# Patient Record
Sex: MALE | Race: OTHER | NOT HISPANIC OR LATINO | URBAN - METROPOLITAN AREA
[De-identification: names, ages, dates, MRNs, and addresses within clinical notes are randomized per-mention and may not be internally consistent; named-entity substitution may affect disease eponyms.]

---

## 2019-03-13 ENCOUNTER — INPATIENT (INPATIENT)
Facility: HOSPITAL | Age: 49
LOS: 0 days | Discharge: HOME | DRG: 313 | End: 2019-03-14
Attending: GENERAL ACUTE CARE HOSPITAL | Admitting: HOSPITALIST
Payer: COMMERCIAL

## 2019-03-13 VITALS
SYSTOLIC BLOOD PRESSURE: 132 MMHG | OXYGEN SATURATION: 97 % | WEIGHT: 125 LBS | HEART RATE: 74 BPM | DIASTOLIC BLOOD PRESSURE: 94 MMHG | RESPIRATION RATE: 18 BRPM | HEIGHT: 65 IN | TEMPERATURE: 98 F

## 2019-03-13 DIAGNOSIS — R07.9 CHEST PAIN, UNSPECIFIED: ICD-10-CM

## 2019-03-13 LAB
ALBUMIN SERPL ELPH-MCNC: 4.6 G/DL — SIGNIFICANT CHANGE UP (ref 3.3–5.2)
ALP SERPL-CCNC: 50 U/L — SIGNIFICANT CHANGE UP (ref 40–120)
ALT FLD-CCNC: 46 U/L — HIGH
ANION GAP SERPL CALC-SCNC: 12 MMOL/L — SIGNIFICANT CHANGE UP (ref 5–17)
APTT BLD: 31.4 SEC — SIGNIFICANT CHANGE UP (ref 27.5–36.3)
AST SERPL-CCNC: 29 U/L — SIGNIFICANT CHANGE UP
BASOPHILS # BLD AUTO: 0 K/UL — SIGNIFICANT CHANGE UP (ref 0–0.2)
BASOPHILS NFR BLD AUTO: 0.4 % — SIGNIFICANT CHANGE UP (ref 0–2)
BILIRUB SERPL-MCNC: 0.5 MG/DL — SIGNIFICANT CHANGE UP (ref 0.4–2)
BUN SERPL-MCNC: 11 MG/DL — SIGNIFICANT CHANGE UP (ref 8–20)
CALCIUM SERPL-MCNC: 9 MG/DL — SIGNIFICANT CHANGE UP (ref 8.6–10.2)
CHLORIDE SERPL-SCNC: 106 MMOL/L — SIGNIFICANT CHANGE UP (ref 98–107)
CK MB CFR SERPL CALC: 2.2 NG/ML — SIGNIFICANT CHANGE UP (ref 0–6.7)
CK SERPL-CCNC: 218 U/L — HIGH (ref 30–200)
CO2 SERPL-SCNC: 23 MMOL/L — SIGNIFICANT CHANGE UP (ref 22–29)
CREAT SERPL-MCNC: 0.85 MG/DL — SIGNIFICANT CHANGE UP (ref 0.5–1.3)
EOSINOPHIL # BLD AUTO: 0.1 K/UL — SIGNIFICANT CHANGE UP (ref 0–0.5)
EOSINOPHIL NFR BLD AUTO: 1.2 % — SIGNIFICANT CHANGE UP (ref 0–5)
GLUCOSE SERPL-MCNC: 89 MG/DL — SIGNIFICANT CHANGE UP (ref 70–115)
HCT VFR BLD CALC: 41.7 % — LOW (ref 42–52)
HGB BLD-MCNC: 14.1 G/DL — SIGNIFICANT CHANGE UP (ref 14–18)
INR BLD: 0.99 RATIO — SIGNIFICANT CHANGE UP (ref 0.88–1.16)
LYMPHOCYTES # BLD AUTO: 1.7 K/UL — SIGNIFICANT CHANGE UP (ref 1–4.8)
LYMPHOCYTES # BLD AUTO: 30.5 % — SIGNIFICANT CHANGE UP (ref 20–55)
MCHC RBC-ENTMCNC: 24.8 PG — LOW (ref 27–31)
MCHC RBC-ENTMCNC: 33.8 G/DL — SIGNIFICANT CHANGE UP (ref 32–36)
MCV RBC AUTO: 73.4 FL — LOW (ref 80–94)
MONOCYTES # BLD AUTO: 0.3 K/UL — SIGNIFICANT CHANGE UP (ref 0–0.8)
MONOCYTES NFR BLD AUTO: 5.7 % — SIGNIFICANT CHANGE UP (ref 3–10)
NEUTROPHILS # BLD AUTO: 3.5 K/UL — SIGNIFICANT CHANGE UP (ref 1.8–8)
NEUTROPHILS NFR BLD AUTO: 61.8 % — SIGNIFICANT CHANGE UP (ref 37–73)
PLATELET # BLD AUTO: 220 K/UL — SIGNIFICANT CHANGE UP (ref 150–400)
POTASSIUM SERPL-MCNC: 4.1 MMOL/L — SIGNIFICANT CHANGE UP (ref 3.5–5.3)
POTASSIUM SERPL-SCNC: 4.1 MMOL/L — SIGNIFICANT CHANGE UP (ref 3.5–5.3)
PROT SERPL-MCNC: 7.5 G/DL — SIGNIFICANT CHANGE UP (ref 6.6–8.7)
PROTHROM AB SERPL-ACNC: 11.4 SEC — SIGNIFICANT CHANGE UP (ref 10–12.9)
RBC # BLD: 5.68 M/UL — SIGNIFICANT CHANGE UP (ref 4.6–6.2)
RBC # FLD: 14.3 % — SIGNIFICANT CHANGE UP (ref 11–15.6)
SODIUM SERPL-SCNC: 141 MMOL/L — SIGNIFICANT CHANGE UP (ref 135–145)
TROPONIN T SERPL-MCNC: <0.01 NG/ML — SIGNIFICANT CHANGE UP (ref 0–0.06)
TROPONIN T SERPL-MCNC: <0.01 NG/ML — SIGNIFICANT CHANGE UP (ref 0–0.06)
WBC # BLD: 5.6 K/UL — SIGNIFICANT CHANGE UP (ref 4.8–10.8)
WBC # FLD AUTO: 5.6 K/UL — SIGNIFICANT CHANGE UP (ref 4.8–10.8)

## 2019-03-13 PROCEDURE — 99285 EMERGENCY DEPT VISIT HI MDM: CPT

## 2019-03-13 PROCEDURE — 71046 X-RAY EXAM CHEST 2 VIEWS: CPT | Mod: 26

## 2019-03-13 PROCEDURE — 93010 ELECTROCARDIOGRAM REPORT: CPT

## 2019-03-13 PROCEDURE — 99223 1ST HOSP IP/OBS HIGH 75: CPT

## 2019-03-13 RX ORDER — MORPHINE SULFATE 50 MG/1
1 CAPSULE, EXTENDED RELEASE ORAL EVERY 4 HOURS
Qty: 0 | Refills: 0 | Status: DISCONTINUED | OUTPATIENT
Start: 2019-03-13 | End: 2019-03-14

## 2019-03-13 RX ORDER — ASPIRIN/CALCIUM CARB/MAGNESIUM 324 MG
162 TABLET ORAL DAILY
Qty: 0 | Refills: 0 | Status: DISCONTINUED | OUTPATIENT
Start: 2019-03-13 | End: 2019-03-14

## 2019-03-13 RX ORDER — NITROGLYCERIN 6.5 MG
0.4 CAPSULE, EXTENDED RELEASE ORAL
Qty: 0 | Refills: 0 | Status: DISCONTINUED | OUTPATIENT
Start: 2019-03-13 | End: 2019-03-14

## 2019-03-13 RX ORDER — PANTOPRAZOLE SODIUM 20 MG/1
40 TABLET, DELAYED RELEASE ORAL
Qty: 0 | Refills: 0 | Status: DISCONTINUED | OUTPATIENT
Start: 2019-03-13 | End: 2019-03-14

## 2019-03-13 NOTE — ED ADULT NURSE NOTE - OBJECTIVE STATEMENT
Patient is alert and verbal, report pinching sensation on left chest, weakness onset 1 day ago, went to his PMD and had EKG. Sent to ER for further evaluation. patient denies chest pain at this time. had similar episode 2 years ago.

## 2019-03-13 NOTE — H&P ADULT - HISTORY OF PRESENT ILLNESS
Patient is a 48 year old male with PMH of GERD who presented to the ED with a two day history of left sided chest pain. Patient reports he was in his usual state of health until 2 days ago, when he intermittently developed the chest discomfort, which he describes as a pinching sensation. Denies any radiation or associated symptoms of SOB, nausea, lightheadedness, dizziness, or diaphoresis. Denies any alleviating or aggravating factors. Reports a similar episode 2-3 years ago at [facility], where he had an unrevealing cardiac work up (TTE and stress test). Patient reports he has been under stress recently due to his mother who suffered a CVA. On further evaluation, patient reports pain is intermittent and not particularly related to activity. In the ED, patient had a negative TNI but significant EKG changes. Cardiology was consulted. Patient recently  mg and is scheduled for a TTE and Cardiac CT.

## 2019-03-13 NOTE — ED ADULT NURSE REASSESSMENT NOTE - NS ED NURSE REASSESS COMMENT FT1
Patient received from ED. Patient awake and alert and resting in the bed. no complaints of chest pain or SOB at this time. IV access intact and flushing without difficulty. patient In NSR on the monitor. will continue to monitor for safety.

## 2019-03-13 NOTE — ED ADULT NURSE REASSESSMENT NOTE - NS ED NURSE REASSESS COMMENT FT1
Patient resting in bed, awake, alert and oriented X3, resp even/unlabored, lungs CTAB, denies CP, monitor in progress, awaiting for echo and dispo, will monitor patient.

## 2019-03-13 NOTE — ED PROVIDER NOTE - OBJECTIVE STATEMENT
47yo M no pmhx pw left sided cp. intermittent non raqdiating lasting seconds. last had pain 2 hrs ago. notes mild sob when gets cp (sharp like) Denies f/c/n/v//palpitations/ cough/rash/headache/dizziness/abd.pain/d/c/dysuria/hematuria. went to his pmd today who sent him to the ed. notes that has been feeling more stressed recently (mother with stroke in [place]) +fmhx of sudden death - his cousin passed away. no hx of dvt/pe no recent travel/sick contacts. 49yo M no pmhx pw left sided cp. intermittent non raqdiating lasting seconds. last had pain 2 hrs ago. notes mild sob when gets cp (sharp like) Denies f/c/n/v//palpitations/ cough/rash/headache/dizziness/abd.pain/d/c/dysuria/hematuria. went to his pmd today who sent him to the ed. notes that has been feeling more stressed recently (mother with stroke in [facility]) +fmhx of sudden death - his cousin passed away. no hx of dvt/pe no recent travel/sick contacts. notes had similar pain 3 years ago was admitted to [facility] had neg stress and echo at that time. currently no cp at this time

## 2019-03-13 NOTE — CONSULT NOTE ADULT - ATTENDING COMMENTS
Patient seen and examined.  49 y/o male from [facility] presents with atypical chest pain, however with dynamic ST segment changes on ECG. For ischemia evaluation.  Telemetry, TTE, CCTA.  May need BB in am if HR > 70. Serial cardiac enzymes.  Repeat 12 lead ECG with re-currence of chest pain.  Check fasting lipid panel and HbA1c for additional risk stratification.   Will follow.    Thank you for allowing me to participate in your patient's care.  D/W Dr. Boswell and patient.

## 2019-03-13 NOTE — ED ADULT TRIAGE NOTE - CHIEF COMPLAINT QUOTE
pt sent from PMD for reports of chest pain x 2 days. pt awake and alert, even and unlabored resps, no SOB.

## 2019-03-13 NOTE — H&P ADULT - ASSESSMENT
Patient is a 48 year old male with PMH of GERD who presented to the ED with a two day history of left sided chest pain.    1. Chest Pain, rule out ACS  -admit to telemetry  -serial TNI and EKG  -SL nitro and/or morphine PRN  -TTE today  -Cardiac CTA in AM  -Check lipid panel and HbA1c    2. GERD  -protonix    DVT ppx - early ambulation Patient is a 48 year old male with PMH of GERD who presented to the ED with a two day history of left sided chest pain.    1. Chest Pain, rule out ACS  -admit to telemetry  -EKG with ST-T depressions  -s/p  mg once  -serial TNI and EKG  -SL nitro and/or morphine PRN  -TTE today  -Cardiac CTA in AM  -Keep NPO after midnight in the event cardiac cath is indicated  -Check lipid panel and HbA1c  -Cardiology recommendations appreciated    2. GERD  -protonix    DVT ppx - early ambulation

## 2019-03-13 NOTE — CONSULT NOTE ADULT - ASSESSMENT
A/P: 47 y/o M with a PMHx of GERD who was BIBA from PCP with abnormal EKG and chest pain. EKG changes as above. Troponin neg x 1. CXR clear.     1. Chest Pain  - Atypical chest pain  - EKG outpatient with 1 mm FAUZIA inferior leads, resolving in repeats  - Troponin neg x 1  - Trend 3 tropoinins  - Obtain TTE  - Obtain CTA cardiac tomorrow morning pending no acute changes  - Monitor for symptom recurrence    2. Abnormal EKG  - Continue to trend EKGs  - Workup as above

## 2019-03-13 NOTE — CONSULT NOTE ADULT - SUBJECTIVE AND OBJECTIVE BOX
Patient is a 48y old  Male who presents with a chief complaint of chest pain.     HPI: 49 y/o M with a PMHx of GERD who was BIBA from PCP with abnormal EKG and chest pain. Patient states that his mother had a stroke in [place] and he has been dealing with worsening stress. Patient states that starting 2 days ago he began experiencing a left sided pinching sensation, 1/10, non-radiating, non-exertional with no associated symptoms. Patient states that the sensation would last for only seconds, and then resolve, but occurred every 1-2 hours. Patient states that he had a similar episode 2-3 years ago, and had a negative workup in [place]. Patient exercises weekly, runs 1-2 miles, denies any chest pain or SOB. Patient still having the pain intermittently, denies fevers, chills, SOB, SKINNER, palpitations, orthopnea, PND, syncope, near syncope, abdominal pain, N/V/D, headache, or dizziness.       PAST MEDICAL & SURGICAL HISTORY:  Acid reflux disease  No significant past surgical history    Allergies    No Known Allergies    MEDICATIONS  (STANDING):    NO HOME MEDICATIONS    FAMILY HISTORY:  Cousin- sudden death in late 40s  Mother- CVA    SOCIAL HISTORY: Denies smoking, alcohol use, or drug use.     REVIEW OF SYSTEMS:  CONSTITUTIONAL: No fever, weight loss, or fatigue  Cardiovascular: AS PER HPI  Respiratory:    Dyspnea,   cough,   ;   Genitourinary:  No dysuria, no hematuria;   Gastrointestinal:   No dark color stool, no melena, no diarrhea, no constipation, no abdominal pain;   Neurological: No headache, no dizziness, no slurred speech;    Psychiatric: No agitation, no anxiety.    ALL OTHER REVIEW OF SYSTEMS ARE NEGATIVE.    Vital Signs Last 24 Hrs  T(C): 36.9 (13 Mar 2019 12:59), Max: 36.9 (13 Mar 2019 12:59)  T(F): 98.4 (13 Mar 2019 12:59), Max: 98.4 (13 Mar 2019 12:59)  HR: 68 (13 Mar 2019 14:26) (68 - 74)  BP: 126/81 (13 Mar 2019 14:26) (126/81 - 132/94)  RR: 16 (13 Mar 2019 14:26) (16 - 18)  SpO2: 100% (13 Mar 2019 14:26) (97% - 100%)    Daily Height in cm: 165.1 (13 Mar 2019 12:59)      PHYSICAL EXAM:  Appearance: Normal, well nourished	  HEENT:   Normal oral mucosa, PERRL, EOMI, sclera non-icteric	  Lymphatic: No cervical lymphadenopathy  Cardiovascular: Normal S1 S2, No JVD, No cardiac murmurs, No carotid bruits, No peripheral edema  Respiratory: Lungs clear to auscultation	  Psychiatry: A & O x 3, Mood & affect appropriate  Gastrointestinal:  Soft, Non-tender, + BS, no bruits	  Skin: No rashes, No ecchymoses, No cyanosis  Neurologic: Grossly non-focal with full strength in all four extremities  Extremities: Normal range of motion, No clubbing, cyanosis or edema  Vascular: Peripheral pulses palpable 2+ bilaterally      INTERPRETATION OF TELEMETRY:    ECG: Initial EKG at [facility] NSR, 1 mm FUAZIA inferior leads, t wave inversion V1-V2  Second EKG NSR, resolving FAUZIA in inferior leads, t wave inversion in V1    LABS:                        14.1   5.6   )-----------( 220      ( 13 Mar 2019 13:49 )             41.7     03-13    141  |  106  |  11.0  ----------------------------<  89  4.1   |  23.0  |  0.85    Ca    9.0      13 Mar 2019 13:49    TPro  7.5  /  Alb  4.6  /  TBili  0.5  /  DBili  x   /  AST  29  /  ALT  46<H>  /  AlkPhos  50  03-13    CARDIAC MARKERS ( 13 Mar 2019 13:49 )  x     / <0.01 ng/mL / 218 U/L / x     / 2.2 ng/mL      PT/INR - ( 13 Mar 2019 13:49 )   PT: 11.4 sec;   INR: 0.99 ratio         PTT - ( 13 Mar 2019 13:49 )  PTT:31.4 sec    I&O's Summary    BNP    RADIOLOGY & ADDITIONAL STUDIES:  < from: Xray Chest 2 Views PA/Lat (03.13.19 @ 16:16) >   EXAM:  XR CHEST PA LAT 2V                          PROCEDURE DATE:  03/13/2019          INTERPRETATION:  PA and lateral chest radiographs     COMPARISON:  NONE.    CLINICAL INFORMATION: Chest Pain.    FINDINGS:    The airway is midline.  There areno airspace consolidations.  There is no pleural effusion or pneumothorax.   The heart size is within the limits of normal.   The visualized osseus structures are intact.     IMPRESSION:    No acute radiographic cardiopulmonary pathology.          < end of copied text > Patient is a 48y old  Male who presents with a chief complaint of chest pain.     HPI: 47 y/o M with a PMHx of GERD who was BIBA from PCP with abnormal EKG and chest pain. Patient states that his mother had a stroke in [place] and he has been dealing with worsening stress. Patient states that starting 2 days ago he began experiencing a left sided pinching sensation, 1/10, non-radiating, non-exertional with no associated symptoms. Patient states that the sensation would last for only seconds, and then resolve, but occurred every 1-2 hours. Patient states that he had a similar episode 2-3 years ago, and had a negative workup in [place]. Patient exercises weekly, runs 1-2 miles, denies any chest pain or SOB. Patient still having the pain intermittently, denies fevers, chills, SOB, SKINNER, palpitations, orthopnea, PND, syncope, near syncope, abdominal pain, N/V/D, headache, or dizziness.       PAST MEDICAL & SURGICAL HISTORY:  Acid reflux disease  No significant past surgical history    Allergies    No Known Allergies    MEDICATIONS  (STANDING):    NO HOME MEDICATIONS    FAMILY HISTORY:  Cousin- sudden death in late 40s  Mother- CVA  father - CVA 60s.     SOCIAL HISTORY: Denies smoking, alcohol use, or drug use.     REVIEW OF SYSTEMS:  CONSTITUTIONAL: No fever, weight loss, or fatigue  Cardiovascular: AS PER HPI  Respiratory:    Dyspnea,   cough,   ;   Genitourinary:  No dysuria, no hematuria;   Gastrointestinal:   No dark color stool, no melena, no diarrhea, no constipation, no abdominal pain;   Neurological: No headache, no dizziness, no slurred speech;    Psychiatric: No agitation, no anxiety.    ALL OTHER REVIEW OF SYSTEMS ARE NEGATIVE.    Vital Signs Last 24 Hrs  T(C): 36.9 (13 Mar 2019 12:59), Max: 36.9 (13 Mar 2019 12:59)  T(F): 98.4 (13 Mar 2019 12:59), Max: 98.4 (13 Mar 2019 12:59)  HR: 68 (13 Mar 2019 14:26) (68 - 74)  BP: 126/81 (13 Mar 2019 14:26) (126/81 - 132/94)  RR: 16 (13 Mar 2019 14:26) (16 - 18)  SpO2: 100% (13 Mar 2019 14:26) (97% - 100%)    Daily Height in cm: 165.1 (13 Mar 2019 12:59)      PHYSICAL EXAM:  Appearance: Normal, well nourished	  HEENT:   Normal oral mucosa, PERRL, EOMI, sclera non-icteric	  Lymphatic: No cervical lymphadenopathy  Cardiovascular: Normal S1 S2, No JVD, No cardiac murmurs, No carotid bruits, No peripheral edema  Respiratory: Lungs clear to auscultation	  Psychiatry: A & O x 3, Mood & affect appropriate  Gastrointestinal:  Soft, Non-tender, + BS, no bruits	  Skin: No rashes, No ecchymoses, No cyanosis  Neurologic: Grossly non-focal with full strength in all four extremities  Extremities: Normal range of motion, No clubbing, cyanosis or edema  Vascular: Peripheral pulses palpable 2+ bilaterally      INTERPRETATION OF TELEMETRY:    ECG: Initial EKG at [facility] NSR, 1 mm FAUZIA inferior leads, t wave inversion V1-V2  Second EKG NSR, resolving FAUZIA in inferior leads, t wave inversion in V1    LABS:                        14.1   5.6   )-----------( 220      ( 13 Mar 2019 13:49 )             41.7     03-13    141  |  106  |  11.0  ----------------------------<  89  4.1   |  23.0  |  0.85    Ca    9.0      13 Mar 2019 13:49    TPro  7.5  /  Alb  4.6  /  TBili  0.5  /  DBili  x   /  AST  29  /  ALT  46<H>  /  AlkPhos  50  03-13    CARDIAC MARKERS ( 13 Mar 2019 13:49 )  x     / <0.01 ng/mL / 218 U/L / x     / 2.2 ng/mL      PT/INR - ( 13 Mar 2019 13:49 )   PT: 11.4 sec;   INR: 0.99 ratio         PTT - ( 13 Mar 2019 13:49 )  PTT:31.4 sec    I&O's Summary    BNP    RADIOLOGY & ADDITIONAL STUDIES:  < from: Xray Chest 2 Views PA/Lat (03.13.19 @ 16:16) >   EXAM:  XR CHEST PA LAT 2V                          PROCEDURE DATE:  03/13/2019          INTERPRETATION:  PA and lateral chest radiographs     COMPARISON:  NONE.    CLINICAL INFORMATION: Chest Pain.    FINDINGS:    The airway is midline.  There areno airspace consolidations.  There is no pleural effusion or pneumothorax.   The heart size is within the limits of normal.   The visualized osseus structures are intact.     IMPRESSION:    No acute radiographic cardiopulmonary pathology.          < end of copied text >

## 2019-03-13 NOTE — ED PROVIDER NOTE - CLINICAL SUMMARY MEDICAL DECISION MAKING FREE TEXT BOX
concern for acs - abnormal ekg pt asymptomatic at this time trop neg mildly elevated st seg in infectior leads no recprocal changes cardiology evaluated pt--will admit to tele for ct coronaries tomorrow

## 2019-03-14 VITALS
SYSTOLIC BLOOD PRESSURE: 112 MMHG | RESPIRATION RATE: 18 BRPM | OXYGEN SATURATION: 97 % | DIASTOLIC BLOOD PRESSURE: 64 MMHG | HEART RATE: 64 BPM

## 2019-03-14 LAB
ANION GAP SERPL CALC-SCNC: 10 MMOL/L — SIGNIFICANT CHANGE UP (ref 5–17)
BASOPHILS # BLD AUTO: 0 K/UL — SIGNIFICANT CHANGE UP (ref 0–0.2)
BASOPHILS NFR BLD AUTO: 0.3 % — SIGNIFICANT CHANGE UP (ref 0–2)
BUN SERPL-MCNC: 14 MG/DL — SIGNIFICANT CHANGE UP (ref 8–20)
CALCIUM SERPL-MCNC: 8.4 MG/DL — LOW (ref 8.6–10.2)
CHLORIDE SERPL-SCNC: 106 MMOL/L — SIGNIFICANT CHANGE UP (ref 98–107)
CHOLEST SERPL-MCNC: 172 MG/DL — SIGNIFICANT CHANGE UP (ref 110–199)
CO2 SERPL-SCNC: 24 MMOL/L — SIGNIFICANT CHANGE UP (ref 22–29)
CREAT SERPL-MCNC: 0.87 MG/DL — SIGNIFICANT CHANGE UP (ref 0.5–1.3)
EOSINOPHIL # BLD AUTO: 0 K/UL — SIGNIFICANT CHANGE UP (ref 0–0.5)
EOSINOPHIL NFR BLD AUTO: 0.4 % — SIGNIFICANT CHANGE UP (ref 0–5)
GLUCOSE SERPL-MCNC: 99 MG/DL — SIGNIFICANT CHANGE UP (ref 70–115)
HBA1C BLD-MCNC: 5.6 % — SIGNIFICANT CHANGE UP (ref 4–5.6)
HCT VFR BLD CALC: 43.6 % — SIGNIFICANT CHANGE UP (ref 42–52)
HDLC SERPL-MCNC: 47 MG/DL — SIGNIFICANT CHANGE UP
HGB BLD-MCNC: 14.6 G/DL — SIGNIFICANT CHANGE UP (ref 14–18)
LIPID PNL WITH DIRECT LDL SERPL: 109 MG/DL — SIGNIFICANT CHANGE UP
LYMPHOCYTES # BLD AUTO: 1.4 K/UL — SIGNIFICANT CHANGE UP (ref 1–4.8)
LYMPHOCYTES # BLD AUTO: 20.2 % — SIGNIFICANT CHANGE UP (ref 20–55)
MAGNESIUM SERPL-MCNC: 2.2 MG/DL — SIGNIFICANT CHANGE UP (ref 1.6–2.6)
MCHC RBC-ENTMCNC: 24.6 PG — LOW (ref 27–31)
MCHC RBC-ENTMCNC: 33.5 G/DL — SIGNIFICANT CHANGE UP (ref 32–36)
MCV RBC AUTO: 73.5 FL — LOW (ref 80–94)
MONOCYTES # BLD AUTO: 0.3 K/UL — SIGNIFICANT CHANGE UP (ref 0–0.8)
MONOCYTES NFR BLD AUTO: 4.8 % — SIGNIFICANT CHANGE UP (ref 3–10)
NEUTROPHILS # BLD AUTO: 5.2 K/UL — SIGNIFICANT CHANGE UP (ref 1.8–8)
NEUTROPHILS NFR BLD AUTO: 74 % — HIGH (ref 37–73)
PHOSPHATE SERPL-MCNC: 2.9 MG/DL — SIGNIFICANT CHANGE UP (ref 2.4–4.7)
PLATELET # BLD AUTO: 214 K/UL — SIGNIFICANT CHANGE UP (ref 150–400)
POTASSIUM SERPL-MCNC: 4.1 MMOL/L — SIGNIFICANT CHANGE UP (ref 3.5–5.3)
POTASSIUM SERPL-SCNC: 4.1 MMOL/L — SIGNIFICANT CHANGE UP (ref 3.5–5.3)
RBC # BLD: 5.93 M/UL — SIGNIFICANT CHANGE UP (ref 4.6–6.2)
RBC # FLD: 14.5 % — SIGNIFICANT CHANGE UP (ref 11–15.6)
SODIUM SERPL-SCNC: 140 MMOL/L — SIGNIFICANT CHANGE UP (ref 135–145)
TOTAL CHOLESTEROL/HDL RATIO MEASUREMENT: 4 RATIO — SIGNIFICANT CHANGE UP (ref 3.4–9.6)
TRIGL SERPL-MCNC: 81 MG/DL — SIGNIFICANT CHANGE UP (ref 10–200)
TROPONIN T SERPL-MCNC: <0.01 NG/ML — SIGNIFICANT CHANGE UP (ref 0–0.06)
TROPONIN T SERPL-MCNC: <0.01 NG/ML — SIGNIFICANT CHANGE UP (ref 0–0.06)
TSH SERPL-MCNC: 1.54 UIU/ML — SIGNIFICANT CHANGE UP (ref 0.27–4.2)
WBC # BLD: 7 K/UL — SIGNIFICANT CHANGE UP (ref 4.8–10.8)
WBC # FLD AUTO: 7 K/UL — SIGNIFICANT CHANGE UP (ref 4.8–10.8)

## 2019-03-14 PROCEDURE — 71046 X-RAY EXAM CHEST 2 VIEWS: CPT

## 2019-03-14 PROCEDURE — 93306 TTE W/DOPPLER COMPLETE: CPT

## 2019-03-14 PROCEDURE — 80053 COMPREHEN METABOLIC PANEL: CPT

## 2019-03-14 PROCEDURE — 99285 EMERGENCY DEPT VISIT HI MDM: CPT | Mod: 25

## 2019-03-14 PROCEDURE — 99239 HOSP IP/OBS DSCHRG MGMT >30: CPT

## 2019-03-14 PROCEDURE — 36415 COLL VENOUS BLD VENIPUNCTURE: CPT

## 2019-03-14 PROCEDURE — 84100 ASSAY OF PHOSPHORUS: CPT

## 2019-03-14 PROCEDURE — 75574 CT ANGIO HRT W/3D IMAGE: CPT

## 2019-03-14 PROCEDURE — 99232 SBSQ HOSP IP/OBS MODERATE 35: CPT

## 2019-03-14 PROCEDURE — 84484 ASSAY OF TROPONIN QUANT: CPT

## 2019-03-14 PROCEDURE — 80061 LIPID PANEL: CPT

## 2019-03-14 PROCEDURE — 85610 PROTHROMBIN TIME: CPT

## 2019-03-14 PROCEDURE — 93010 ELECTROCARDIOGRAM REPORT: CPT

## 2019-03-14 PROCEDURE — 82550 ASSAY OF CK (CPK): CPT

## 2019-03-14 PROCEDURE — 83735 ASSAY OF MAGNESIUM: CPT

## 2019-03-14 PROCEDURE — 82553 CREATINE MB FRACTION: CPT

## 2019-03-14 PROCEDURE — 85027 COMPLETE CBC AUTOMATED: CPT

## 2019-03-14 PROCEDURE — 75574 CT ANGIO HRT W/3D IMAGE: CPT | Mod: 26

## 2019-03-14 PROCEDURE — 84443 ASSAY THYROID STIM HORMONE: CPT

## 2019-03-14 PROCEDURE — 85730 THROMBOPLASTIN TIME PARTIAL: CPT

## 2019-03-14 PROCEDURE — 80048 BASIC METABOLIC PNL TOTAL CA: CPT

## 2019-03-14 PROCEDURE — 83036 HEMOGLOBIN GLYCOSYLATED A1C: CPT

## 2019-03-14 PROCEDURE — 93005 ELECTROCARDIOGRAM TRACING: CPT

## 2019-03-14 RX ORDER — METOPROLOL TARTRATE 50 MG
25 TABLET ORAL ONCE
Qty: 0 | Refills: 0 | Status: COMPLETED | OUTPATIENT
Start: 2019-03-14 | End: 2019-03-14

## 2019-03-14 RX ADMIN — PANTOPRAZOLE SODIUM 40 MILLIGRAM(S): 20 TABLET, DELAYED RELEASE ORAL at 05:56

## 2019-03-14 RX ADMIN — Medication 25 MILLIGRAM(S): at 11:06

## 2019-03-14 RX ADMIN — Medication 162 MILLIGRAM(S): at 11:06

## 2019-03-14 NOTE — DISCHARGE NOTE PROVIDER - CARE PROVIDERS DIRECT ADDRESSES
[email or web address]

## 2019-03-14 NOTE — DISCHARGE NOTE PROVIDER - NSDCCPCAREPLAN_GEN_ALL_CORE_FT
PRINCIPAL DISCHARGE DIAGNOSIS  Diagnosis: Chest pain, unspecified type  Assessment and Plan of Treatment: MI ruled out  CT chest with O calcium score, no indication for aspirin or statin  Please follow up at Dr. Griffith office tomorrow to  holter monitor      SECONDARY DISCHARGE DIAGNOSES  Diagnosis: Bradycardia  Assessment and Plan of Treatment: please follow up with Dr. Griffith office on 3/15 to  Holter Monitor

## 2019-03-14 NOTE — DISCHARGE NOTE PROVIDER - CARE PROVIDER_API CALL
Sandra Griffith (DO)  Cardiology; Internal Medicine  [address] [place] [address]  Phone: [phone]  Fax: [phone]  Follow Up Time:

## 2019-03-14 NOTE — DISCHARGE NOTE NURSING/CASE MANAGEMENT/SOCIAL WORK - NSDCDPATPORTLINK_GEN_ALL_CORE
You can access the [email or web address], offered by [facility], by registering with the following website: [email or web address] the [facility]inally Insane

## 2019-03-14 NOTE — DISCHARGE NOTE PROVIDER - HOSPITAL COURSE
Patient is a 48 year old male with PMH of GERD who presented to the ED with a two day history of left sided chest pain. Patient reports he was in his usual state of health until 2 days ago, when he intermittently developed the chest discomfort, which he describes as a pinching sensation. Denies any radiation or associated symptoms of SOB, nausea, lightheadedness, dizziness, or diaphoresis. Denies any alleviating or aggravating factors. Reports a similar episode 2-3 years ago at [facility], where he had an unrevealing cardiac work up (TTE and stress test). Patient reports he has been under stress recently due to his mother who suffered a CVA. On further evaluation, patient reports pain is intermittent and not particularly related to activity. In the ED, patient had a negative TNI but significant EKG changes. Cardiology was consulted. Patient recently  mg and underwent a TTE and Cardiac CT with O calcium score. Cleared for discharge home per cardiology with outpatient follow up for [facility].        Medically stable for discharge. Time spent on discharge 45 minutes.

## 2022-02-11 NOTE — ED PROVIDER NOTE - CROS ED GU ALL NEG
[FreeTextEntry1] : #PAD\par - Podiatry and vascular referral given\par - CArdiology referral as most likely patient may have CAD\par - A1C, lipid profile ordered\par \par #preventive measure\par - hepatitis c screen done\par - HIV screen done\par \par #HTN\par - uncontrolled, [place] from [place], [place] sure of [place]\par - Will bring them in next visit\par - limit intake of salt\par \par RTC in 1 week for CPE/ HTN follow up\par \par d/w Dr. Bruce\par 
negative...

## 2023-06-30 NOTE — ED PROVIDER NOTE - CADM POA CENTRAL LINE
Other (Free Text): Mohs JZC 1/18/23
Render Risk Assessment In Note?: no
Note Text (......Xxx Chief Complaint.): This diagnosis correlates with the
Detail Level: Zone
No